# Patient Record
Sex: FEMALE | Race: WHITE
[De-identification: names, ages, dates, MRNs, and addresses within clinical notes are randomized per-mention and may not be internally consistent; named-entity substitution may affect disease eponyms.]

---

## 2021-04-14 ENCOUNTER — HOSPITAL ENCOUNTER (EMERGENCY)
Dept: HOSPITAL 76 - ED | Age: 71
LOS: 1 days | Discharge: HOME | End: 2021-04-15
Payer: MEDICARE

## 2021-04-14 VITALS — SYSTOLIC BLOOD PRESSURE: 180 MMHG | DIASTOLIC BLOOD PRESSURE: 90 MMHG
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
## 2021-04-14 DIAGNOSIS — S60.445A: ICD-10-CM

## 2021-04-14 DIAGNOSIS — L03.012: Primary | ICD-10-CM

## 2021-04-14 PROCEDURE — 87181 SC STD AGAR DILUTION PER AGT: CPT

## 2021-04-14 PROCEDURE — 87070 CULTURE OTHR SPECIMN AEROBIC: CPT

## 2021-04-14 PROCEDURE — 87077 CULTURE AEROBIC IDENTIFY: CPT

## 2021-04-14 PROCEDURE — 87205 SMEAR GRAM STAIN: CPT

## 2021-04-14 PROCEDURE — 99282 EMERGENCY DEPT VISIT SF MDM: CPT

## 2021-04-14 PROCEDURE — 99283 EMERGENCY DEPT VISIT LOW MDM: CPT

## 2021-04-14 NOTE — EXTERNAL MEDICAL SUMMARY RPT
Continuity of Care Document

                            Created on:2021



Patient:TINO FERRER

Sex:Female

:1950

External Reference #:[id number]





Demographics







                          Phone                     Unavailable

 

                          Preferred Language        Unknown

 

                          Marital Status            Unknown

 

                          Restorationist Affiliation     Unknown

 

                          Race                      Unknown

 

                          Ethnic Group              Unknown









Author







                          Organization              [facility]

 

                          [place] [address]

 

                          Phone                     [phone]









Social History







                     date                description         facility

 

                     [id number]

## 2021-04-14 NOTE — ED PHYSICIAN DOCUMENTATION
History of Present Illness





- Stated complaint


Stated Complaint: L FINGER INSECT BITE





- Chief complaint


Chief Complaint: Ext Problem





- History obtained from


History obtained from: Patient





- History of Present Illness


Timing: Yesterday


Pain level now: 4





- Treatment prior to arrival


Treatment prior to arrival: 





started on cefuroxime by PMD yesterday. 





- Additonal information


Additional information: 





patient has atraumatic swelling and erythema of left hand since yesterday, 

gradual onset but slowly, steadily progressing in area and swelling. started on 

cefuroxime by PMD yesterday. she is right hand dominant. she presents due to 

concern that her ring on left second finger is too tight to remove





Review of Systems


Constitutional: reports: Reviewed and negative


Skin: reports: Rash


Musculoskeletal: reports: Extremity pain, Extremity swelling





PD PAST MEDICAL HISTORY





- Past Medical History


Past Medical History: No





- Allergies


Allergies/Adverse Reactions: 


                                    Allergies











Allergy/AdvReac Type Severity Reaction Status Date / Time


 


clindamycin Allergy  Unknown Verified 04/14/21 22:37


 


omeprazole [From Prilosec] Allergy  Unknown Verified 04/14/21 22:37


 


Statins-Hmg-Coa Reductase Allergy  Unknown Verified 04/14/21 22:37





Inhibitor     














- Living Situation


Living Arrangement: reports: At home





PD ED PE NORMAL





- Vitals


Vital signs reviewed: Yes





- General


General: Alert and oriented X 3, No acute distress, Well developed/nourished





PD ED PE EXPANDED





- Extremities


Extremities: Other (swelling, erythema, tenderness at base of left second digit 

with ring that cannot be removed due to surrounding swelling)


ANJELICA UE/Hands Visual: 


                            __________________________














                            __________________________





 1 - rash, swelling, tenderness








Results





- Vitals


Vitals: 


                                     Oxygen











O2 Source                      Room air

















- Labs


Labs: 


                                  Microbiology











 04/15/21 00:55 Wound Culture - Preliminary





 Abscess 














PD MEDICAL DECISION MAKING





- ED course


Complexity details: considered differential, d/w patient


ED course: 





using ring cutter, the ring was removed from left second finger. tip of left 

second digit NVI both before and after ring removal. immediately adjacent 

(proximal) to where the ring was located, there was small bulla with purulence 

that was expressed after small opening was made using bevelled edge of 18g 

needle on end of 10cc syringe. pus sent for culture. there is no deeper 

fluctuance to suggest abscess for I+D





Departure





- Departure


Disposition: 01 Home, Self Care


Clinical Impression: 


 Cellulitis of finger, left





Condition: Good


Instructions:  ED Infec Skin Cellulitis


Follow-Up: 


Yolanda Rodriguez MD [Primary Care Provider] -  (3-5 days if not improving)


Comments: 


Continue the antibiotic as prescribed 


Discharge Date/Time: 04/15/21 01:11

## 2023-04-19 ENCOUNTER — HOSPITAL ENCOUNTER (OUTPATIENT)
Dept: HOSPITAL 76 - SC | Age: 73
Discharge: HOME | End: 2023-04-19
Attending: NURSE PRACTITIONER
Payer: MEDICARE

## 2023-04-19 VITALS — DIASTOLIC BLOOD PRESSURE: 60 MMHG | SYSTOLIC BLOOD PRESSURE: 122 MMHG

## 2023-04-19 DIAGNOSIS — E66.9: ICD-10-CM

## 2023-04-19 DIAGNOSIS — G47.33: Primary | ICD-10-CM

## 2023-04-19 PROCEDURE — 99203 OFFICE O/P NEW LOW 30 MIN: CPT

## 2023-04-19 PROCEDURE — 99212 OFFICE O/P EST SF 10 MIN: CPT

## 2023-04-19 NOTE — SLEEP CARE CONSULTATION
Information from patient questionnaire entered by Cintia Boothe.





I have reviewed and concur with the information entered by Cintia Boothe. This 

document represents the service I personally performed and the decisions made by

me, Marlyn Rubio ARNP.





History of Present Illness


Service Date and Time: 04/19/2023    1042


Reason for Visit: New patient, Previously diagnosed sleep apnea, sleep apnea on 

CPAP therapy


Chief Complaint: reports: Unrefreshed sleep, Fatigue


Date of Onset: 2YRS


Usual bedtime: 1030-11PM


Time it takes to fall asleep: 5-15MIN


Snores at night: Yes


Observed to quit breathing while asleep: No


Sleeps alone due to snoring: No


Number of times waking at night: BEFORE CPAP 1-2 AFTER 0-1


Reasons for waking at night: reports: Bathroom, Other (UNKNOWN)


Toss, Turn, or Twitch while sleeping: No


Recalls having dreams: No


Usually gets out of bed at: 845-9AM


Feels refreshed in the morning: Yes


Morning headache: No


Sleepy or fatigued during the day: Yes


Ever fallen asleep while driving: No


Takes day naps: No


Prior sleep studies: Yes


Year and Where: [facility], 2021


Type of Sleep Study: Home sleep study


Additional HPI information: 





TINO FERRER was previously diagnosed to have mild, AHI 10.4, obstructive sleep 

apnea-hypopnea syndrome in a HST dated 11- through [facility] 

and comes in today to establish care for CPAP therapy.





- Parasomnia Symptoms


Ever been unable to move upon waking from sleep: Yes


Walks in sleep: No


Talks in sleep: No


Ever acted out dreams in sleep: Yes


Ever felt weak in the knees when startled or emotional: No


Bothered by creepy, crawly, restless sensations in legs: No


Problems with memory or concentration: Yes





CPAP Compliance Data





- Data Reviewed with Patient


Average duration of nightly device use: 8.3


Compliance rate %: 91 (for last 6 months)


Current pressure setting (cmH2O): 6-16


Average residual AHI: 0.3


Central apnea: 0.2


Average large leak: 5.44 l/min


Compliance data discussion: 





Patient has a Resvent iBreeze CPAP machine. She gets her supplies from [employer]. 

She is using a nasal cushion mask. 





She brought in her SD card but we are unable to access data from the card. A 

call has been done to [facility] with no response at this time. Patient's  

was able to bring in her machine and we were able to get some data from there.





She states she uses her CPAP every night but will take mask off at end of night 

sometimes. She enjoys laying in bed in the morning. 





Subjective


Patient concerns: denies: aerophagia, mask discomfort, air blowing in eyes, mask

leak noise, condensation in mask/hose, nasal congestion, dry mouth, nose, 

throat, epistaxis


Observed to snore while using device: No


Current pressure setting perceived as: comfortable


On therapy, patient: reports: sleeping better, awakening more refreshed, being 

more awake and alert during the day, more rested overall.  denies: drowsiness 

while driving


Initial [place] Sleepiness Scale score: 9 (04/18/23)





Past Medical History


Past Medical History: reports: Hypertension, Diabetes, Stroke (Oct 2019), 

Insulin resistance, Hypothyroidism, Other (tramatic brain injury 23 yrs ago; 

hysterectomy & ovaries out in 1991)





Social History


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 Patient is  and lives in 

[place]. 





Have you smoked in the past 12 months: No


Alcohol use: Yes


Alcohol amount and frequency: ONCE A QTR OR LESS


Caffeine use: Yes


Caffeine amount and frequency: 2CUPS BLACK COFFEE DAILY





Family History


Family history of sleep disordered breathing: Yes


Family Hx Sleep Apnea: Mother: Snoring, Grandparent: Snoring





Allergies and Home Medications


Known drug allergies: Yes (as listed)


Drug allergies reviewed: Yes


Home medication list reviewed: Yes


Allergy and home medication list: 


Allergies





clindamycin Allergy (Verified 04/18/23 13:54)


   Unknown


omeprazole [From Prilosec] Allergy (Verified 04/18/23 13:54)


   Unknown


Statins-HMG-CoA Reductase Inhibitor [Statins-Hmg-Coa Reductase Inhibitor] 

Allergy (Verified 04/18/23 13:54)


   Unknown


Ampicillin


Augmentin





Review of Systems


Weight gain over past 5 years: 40


Cardiovascular: reports: high blood pressure


Gastrointestinal: reports: other (CONSTIPATION -TREATED).  denies: heartburn


Neurological: reports: head trauma, gait or balance problems


Psychiatric: denies: anxiety, depression


Ear/Nose/Throat: reports: hoarseness, tonsillectomy, wisdom teeth removed


Endocrine: reports: thyroid disease, sluggishness, too hot or cold


Immunologic: reports: allergies to food or environment





Physical Exam


Vital signs obtained and entered by: CINTIA MANTILLA MA


Blood Pressure: 122/60 (LEFT ARM )


Cuff size: regular


Heart Rate: 87


O2 Saturation: 97


Height: 5 ft 6.5 in


Weight: 212 lb 6.4 oz


Body Mass Index: 33.7


BMI Classification: Obese


Neck circumference: 16.5


Heart: regular rate and rhythm


Lungs: clear bilaterally





Impression and Plan





1. Obstructive Sleep Apnea-Hypopnea Syndrome, mild, with good treatment 

compliance and good apnea control. On CPAP therapy, the patient has better sleep

quality and is more rested overall. Patient has significant improvement of their

sleep apnea and is satisfied with current CPAP therapy.  Patient denies problems

with oral dryness, nasal congestion, epistaxis, skin irritation or aerophagia.  

Patient's apnea severity and rationale for treatment to reduce apnea, improve 

sleep quality and reduce cardiovascular and cerebrovascular events was reviewed.

I also reviewed the benefit of consistent device use of CPAP for hypertension, 

diabetes (insulin resistance) and cerebrovascular disease (stroke).





2. Obesity, unspecified. Currently patients BMI is 33.7.  Obesity increases the

risk of apnea, CPAP pressure requirements and overall health risks especially 

cardiovascular and diabetes. Thus patient is advised to lose weight. 





* Continue auto CPAP pressure at 6-16 cmH2O


* Update supplies


* Notify me if snoring with mask or feeling that the pressure is too much or too

  little


* Attempt to lose weight


* Call this office if any problems using CPAP


* Return for follow up in 1 year, or sooner if concerns arise





Counseling Topics: Spare mask, Weight loss health impact


Visit Type: In Office


Time Spent with Patient (minutes): 32


Provider Statement: I spent 100% of the Face to Face Visit with the patient with

greater than 50% spent counseling the patient and coordination of care.

## 2024-05-30 ENCOUNTER — HOSPITAL ENCOUNTER (OUTPATIENT)
Dept: HOSPITAL 76 - SC | Age: 74
Discharge: HOME | End: 2024-05-30
Attending: NURSE PRACTITIONER
Payer: MEDICARE

## 2024-05-30 VITALS — SYSTOLIC BLOOD PRESSURE: 147 MMHG | OXYGEN SATURATION: 96 % | DIASTOLIC BLOOD PRESSURE: 68 MMHG

## 2024-05-30 DIAGNOSIS — G47.33: Primary | ICD-10-CM

## 2024-05-30 DIAGNOSIS — E66.9: ICD-10-CM

## 2024-05-30 PROCEDURE — 99213 OFFICE O/P EST LOW 20 MIN: CPT

## 2024-05-30 PROCEDURE — 99212 OFFICE O/P EST SF 10 MIN: CPT

## 2024-05-30 NOTE — SLEEP CARE CONSULTATION
Information from patient questionnaire entered by Cintia Boothe.





I have reviewed and concur with the information entered by Cintia Boothe. This 

document represents the service I personally performed and the decisions made by

me, Marlyn Rubio ARNP.





History of Present Illness


Service Date and Time: 05/30/2024    1356


Previous diagnosis: Mild, Obstructive Sleep Apnea-Hypopnea Syndrome


AHI: 10.4 (in 11/28/2021)


Reason for follow up: annual (NO CPAP)


Equipment type: CPAP (IBreeze)


Equipment obtained from: [employer] (getting supplies)


Mask style: Nasal


Backup mask available: No (unsure)


Last cushion change: changed regularly


Prior sleep studies: Yes


Year and Where: [facility], 2021


Type of Sleep Study: Home sleep study


HPI additional information: 





TINO FERRER was diagnosed to have mild, AHI 10.4, obstructive sleep apnea-

hypopnea syndrome and returned today for CPAP therapy annual follow-up.





Sleep Study





- Results


Type of Sleep Study: Home sleep study


Prior sleep studies: Yes


Year and Where: [facility], 2021





CPAP Compliance Data





- Data Reviewed with Patient


Average duration of nightly device use: 8 hours


Compliance rate %: 92 (334/365 days used)


Current pressure setting (cmH2O): 6-16 (P95 7.8)


Average residual AHI: 0.6


Average large leak: 7.7 L/min





Subjective


Missed days of use due to: reports: other (night forgot and fell asleep first)


Patient concerns: reports: mask leak noise (sometimes).  denies: aerophagia, 

mask discomfort, air blowing in eyes, condensation in mask/hose, nasal 

congestion, dry mouth, nose, throat, epistaxis


Observed to snore while using device: No


Current pressure setting perceived as: comfortable


On therapy, patient: reports: sleeping better, awakening more refreshed, being 

more awake and alert during the day, more rested overall.  denies: drowsiness 

while driving


Initial [place] Sleepiness Scale score: 9 (04/18/23)


Current [place] Sleepiness Scale score: 5 (05/30/24)





Allergies and Home Medications


Known drug allergies: Yes (as listed)


Drug allergies reviewed: Yes


Home medication list reviewed: Yes (Metformin 500 mg, bid)


Allergy and home medication list: 


Allergies





clindamycin Allergy (Verified 05/28/24 09:41)


   Unknown


omeprazole [From Prilosec] Allergy (Verified 05/28/24 09:41)


   Unknown


Statins-HMG-CoA Reductase Inhibitor [Statins-Hmg-Coa Reductase Inhibitor] 

Allergy (Verified 05/28/24 09:41)


   Unknown





Review of Systems


Review of systems same as previous: No (PREDIABETIC, INSULIN RESISTANT)





Physical Exam


Vital signs obtained and entered by: CINTIA MANTILLA MA


Blood Pressure: 147/68 (RIGHT ARM)


Cuff size: long


Heart Rate: 85


O2 Saturation: 96


Height: 5 ft 6.5 in


Weight: 215 lb


Weight change since last visit: 3 lb gain


Body Mass Index: 34.2


BMI Classification: Obese





Impression and Plan





1. Obstructive Sleep Apnea-Hypopnea Syndrome, mild, with good treatment 

compliance and good apnea control. On CPAP therapy, the patient has better sleep

quality and is more rested overall.  Patient has significant improvement of 

their sleep apnea and is satisfied with current CPAP therapy. Patient denies 

problems with oral dryness, nasal congestion, epistaxis, skin irritation or 

aerophagia.  Patient's apnea severity and rationale for treatment to reduce 

apnea, improve sleep quality and reduce cardiovascular and cerebrovascular 

events was reviewed. I also reviewed the benefit of consistent device use of 

CPAP for hypertension, cerebrovascular disease (stroke), pre-diabetes, insulin 

resistance.





2. Obesity, unspecified. Currently patients BMI is 34.2.  Obesity increases the

risk of apnea, CPAP pressure requirements and overall health risks especially 

cardiovascular and diabetes. Thus patient is advised to lose weight. 





* Continue auto CPAP pressure at 6-16 cmH2O


* Update supply prescription


* Notify me if snoring with mask or feeling that the pressure is too much or too

  little


* Attempt to lose weight


* Call this office if any problems using CPAP


* Return for follow up in 12 month, or sooner if concerns arise





Counseling Topics: Weight loss health impact


Prescriptions: Device supplies


Follow up with Sleep Care in: 1 year


Visit Type: In Office


Time Spent with Patient (minutes): 22


Provider Statement: I spent 100% of the Face to Face Visit with the patient with

greater than 50% spent counseling the patient and coordination of care.

## 2024-05-30 NOTE — SLEEP PATIENT INSTRUCTIONS
Sleep Center Visit Summary





- Patient Visit Information


Reason for Visit: 





Annual follow up





- Patient Instructions


Additional Instructions: 








You will continue with CPAP therapy with pressure set at 6-16 cmH2O.





A supply prescription will be updated with your DME. 





We encourage you to continue to try to lose weight. 





Please follow up with the sleep care office in 1 year.








- Clinic Information


Contact: 





[facility]


[address] [place] [address]


[email or web address]


T: [phone]